# Patient Record
Sex: FEMALE | Race: WHITE | HISPANIC OR LATINO | Employment: UNEMPLOYED | URBAN - METROPOLITAN AREA
[De-identification: names, ages, dates, MRNs, and addresses within clinical notes are randomized per-mention and may not be internally consistent; named-entity substitution may affect disease eponyms.]

---

## 2023-09-14 ENCOUNTER — OFFICE VISIT (OUTPATIENT)
Dept: FAMILY MEDICINE | Facility: CLINIC | Age: 33
End: 2023-09-14
Payer: COMMERCIAL

## 2023-09-14 VITALS
RESPIRATION RATE: 22 BRPM | TEMPERATURE: 98.2 F | DIASTOLIC BLOOD PRESSURE: 60 MMHG | OXYGEN SATURATION: 100 % | HEIGHT: 62 IN | HEART RATE: 89 BPM | WEIGHT: 136 LBS | BODY MASS INDEX: 25.03 KG/M2 | SYSTOLIC BLOOD PRESSURE: 106 MMHG

## 2023-09-14 DIAGNOSIS — Z90.3 S/P GASTRIC SLEEVE PROCEDURE: ICD-10-CM

## 2023-09-14 DIAGNOSIS — Z11.3 ROUTINE SCREENING FOR STI (SEXUALLY TRANSMITTED INFECTION): ICD-10-CM

## 2023-09-14 DIAGNOSIS — Z11.59 NEED FOR HEPATITIS C SCREENING TEST: ICD-10-CM

## 2023-09-14 DIAGNOSIS — Z12.4 CERVICAL CANCER SCREENING: ICD-10-CM

## 2023-09-14 DIAGNOSIS — N76.0 BACTERIAL VAGINOSIS: ICD-10-CM

## 2023-09-14 DIAGNOSIS — B96.89 BACTERIAL VAGINOSIS: ICD-10-CM

## 2023-09-14 DIAGNOSIS — B37.31 YEAST INFECTION OF THE VAGINA: ICD-10-CM

## 2023-09-14 DIAGNOSIS — N89.8 VAGINAL DISCHARGE: ICD-10-CM

## 2023-09-14 DIAGNOSIS — Z00.00 ROUTINE HISTORY AND PHYSICAL EXAMINATION OF ADULT: Primary | ICD-10-CM

## 2023-09-14 DIAGNOSIS — D50.0 IRON DEFICIENCY ANEMIA DUE TO CHRONIC BLOOD LOSS: ICD-10-CM

## 2023-09-14 DIAGNOSIS — Z11.4 SCREENING FOR HIV (HUMAN IMMUNODEFICIENCY VIRUS): ICD-10-CM

## 2023-09-14 DIAGNOSIS — Z13.1 SCREENING FOR DIABETES MELLITUS: ICD-10-CM

## 2023-09-14 LAB
CLUE CELLS: PRESENT
ERYTHROCYTE [DISTWIDTH] IN BLOOD BY AUTOMATED COUNT: 17.2 % (ref 10–15)
HBA1C MFR BLD: 5.1 % (ref 0–5.6)
HCT VFR BLD AUTO: 27.8 % (ref 35–47)
HGB BLD-MCNC: 8.4 G/DL (ref 11.7–15.7)
MCH RBC QN AUTO: 19.8 PG (ref 26.5–33)
MCHC RBC AUTO-ENTMCNC: 30.2 G/DL (ref 31.5–36.5)
MCV RBC AUTO: 65 FL (ref 78–100)
PLATELET # BLD AUTO: 301 10E3/UL (ref 150–450)
RBC # BLD AUTO: 4.25 10E6/UL (ref 3.8–5.2)
TRICHOMONAS, WET PREP: ABNORMAL
WBC # BLD AUTO: 5.8 10E3/UL (ref 4–11)
WBC'S/HIGH POWER FIELD, WET PREP: ABNORMAL
YEAST, WET PREP: PRESENT

## 2023-09-14 PROCEDURE — 83540 ASSAY OF IRON: CPT | Performed by: NURSE PRACTITIONER

## 2023-09-14 PROCEDURE — G0145 SCR C/V CYTO,THINLAYER,RESCR: HCPCS | Performed by: NURSE PRACTITIONER

## 2023-09-14 PROCEDURE — 83550 IRON BINDING TEST: CPT | Performed by: NURSE PRACTITIONER

## 2023-09-14 PROCEDURE — 90471 IMMUNIZATION ADMIN: CPT | Performed by: NURSE PRACTITIONER

## 2023-09-14 PROCEDURE — 87624 HPV HI-RISK TYP POOLED RSLT: CPT | Performed by: NURSE PRACTITIONER

## 2023-09-14 PROCEDURE — 87210 SMEAR WET MOUNT SALINE/INK: CPT | Performed by: NURSE PRACTITIONER

## 2023-09-14 PROCEDURE — 83036 HEMOGLOBIN GLYCOSYLATED A1C: CPT | Performed by: NURSE PRACTITIONER

## 2023-09-14 PROCEDURE — 87389 HIV-1 AG W/HIV-1&-2 AB AG IA: CPT | Performed by: NURSE PRACTITIONER

## 2023-09-14 PROCEDURE — 99385 PREV VISIT NEW AGE 18-39: CPT | Mod: 25 | Performed by: NURSE PRACTITIONER

## 2023-09-14 PROCEDURE — 86803 HEPATITIS C AB TEST: CPT | Performed by: NURSE PRACTITIONER

## 2023-09-14 PROCEDURE — 87491 CHLMYD TRACH DNA AMP PROBE: CPT | Performed by: NURSE PRACTITIONER

## 2023-09-14 PROCEDURE — 36415 COLL VENOUS BLD VENIPUNCTURE: CPT | Performed by: NURSE PRACTITIONER

## 2023-09-14 PROCEDURE — 87591 N.GONORRHOEAE DNA AMP PROB: CPT | Performed by: NURSE PRACTITIONER

## 2023-09-14 PROCEDURE — 82728 ASSAY OF FERRITIN: CPT | Performed by: NURSE PRACTITIONER

## 2023-09-14 PROCEDURE — 90686 IIV4 VACC NO PRSV 0.5 ML IM: CPT | Performed by: NURSE PRACTITIONER

## 2023-09-14 PROCEDURE — 85027 COMPLETE CBC AUTOMATED: CPT | Performed by: NURSE PRACTITIONER

## 2023-09-14 RX ORDER — FLUCONAZOLE 150 MG/1
150 TABLET ORAL ONCE
Qty: 1 TABLET | Refills: 0 | Status: SHIPPED | OUTPATIENT
Start: 2023-09-14 | End: 2023-09-14

## 2023-09-14 RX ORDER — METRONIDAZOLE 7.5 MG/G
1 GEL VAGINAL DAILY
Qty: 70 G | Refills: 0 | Status: SHIPPED | OUTPATIENT
Start: 2023-09-14 | End: 2023-09-19

## 2023-09-14 ASSESSMENT — ENCOUNTER SYMPTOMS
PALPITATIONS: 0
HEMATURIA: 0
WEAKNESS: 0
DYSURIA: 0
FREQUENCY: 0
FEVER: 0
CHILLS: 0
ARTHRALGIAS: 0
NAUSEA: 0
MYALGIAS: 0
HEMATOCHEZIA: 0
PARESTHESIAS: 0
COUGH: 0
BREAST MASS: 0
ABDOMINAL PAIN: 0
HEADACHES: 0
DIZZINESS: 1
SHORTNESS OF BREATH: 0
DIARRHEA: 0
JOINT SWELLING: 0
HEARTBURN: 1
SORE THROAT: 0
CONSTIPATION: 1
NERVOUS/ANXIOUS: 1
EYE PAIN: 0

## 2023-09-14 ASSESSMENT — PATIENT HEALTH QUESTIONNAIRE - PHQ9
SUM OF ALL RESPONSES TO PHQ QUESTIONS 1-9: 2
SUM OF ALL RESPONSES TO PHQ QUESTIONS 1-9: 2
10. IF YOU CHECKED OFF ANY PROBLEMS, HOW DIFFICULT HAVE THESE PROBLEMS MADE IT FOR YOU TO DO YOUR WORK, TAKE CARE OF THINGS AT HOME, OR GET ALONG WITH OTHER PEOPLE: NOT DIFFICULT AT ALL

## 2023-09-14 ASSESSMENT — PAIN SCALES - GENERAL: PAINLEVEL: NO PAIN (0)

## 2023-09-14 NOTE — PROGRESS NOTES
SUBJECTIVE:   CC: Matilde is an 33 year old who presents for preventive health visit.       Healthy Habits:     Getting at least 3 servings of Calcium per day:  Yes    Bi-annual eye exam:  NO    Dental care twice a year:  NO    Sleep apnea or symptoms of sleep apnea:  None    Diet:  Low salt, Low fat/cholesterol and Carbohydrate counting    Frequency of exercise:  None    Taking medications regularly:  Yes    Medication side effects:  Not applicable and None    Additional concerns today:  No    States she had a Pap about a year ago.  Also reports he had a colposcopy in [facility] about 4 years ago, h/o positive HPV.    Had gone to the ED for itching, bleeding with clots, and discharge, clots.  Having headache, nausea.    PSH:  Gastric sleeve, c section, arm surgery in childhood      Today's PHQ-9 Score:       2023     1:17 PM   PHQ-9 SCORE   PHQ-9 Total Score MyChart 2 (Minimal depression)   PHQ-9 Total Score 2       Have you ever done Advance Care Planning? (For example, a Health Directive, POLST, or a discussion with a medical provider or your loved ones about your wishes): No, advance care planning information given to patient to review.  Patient declined advance care planning discussion at this time.    Social History     Tobacco Use    Smoking status: Never    Smokeless tobacco: Never   Substance Use Topics    Alcohol use: Not on file             2023     1:15 PM   Alcohol Use   Prescreen: >3 drinks/day or >7 drinks/week? No          No data to display              Reviewed orders with patient.  Reviewed health maintenance and updated orders accordingly - Yes  BP Readings from Last 3 Encounters:   23 106/60    Wt Readings from Last 3 Encounters:   23 61.7 kg (136 lb)                  Patient Active Problem List   Diagnosis    S/P gastric sleeve procedure    Iron deficiency anemia due to chronic blood loss     Past Surgical History:   Procedure Laterality Date     SECTION       COLPOSCOPY      In [facility]    ORTHOPEDIC SURGERY      Arm surgery in childhood    OTHER SURGICAL HISTORY      Gastric Sleeve in [facility]       Social History     Tobacco Use    Smoking status: Never    Smokeless tobacco: Never   Substance Use Topics    Alcohol use: Not on file     History reviewed. No pertinent family history.      No Known Allergies    Breast Cancer Screenin/14/2023     1:17 PM   Breast CA Risk Assessment (FHS-7)   Do you have a family history of breast, colon, or ovarian cancer? No / Unknown       Patient under 40 years of age: Routine Mammogram Screening not recommended.   Pertinent mammograms are reviewed under the imaging tab.    History of abnormal Pap smear: yes, see HPI      2023     2:45 PM   PAP / HPV   PAP Negative for Intraepithelial Lesion or Malignancy (NILM)      Reviewed and updated as needed this visit by clinical staff   Tobacco  Allergies  Meds  Problems  Med Hx  Surg Hx  Fam Hx          Reviewed and updated as needed this visit by Provider    Allergies  Meds  Problems  Med Hx  Surg Hx  Fam Hx           Review of Systems   Constitutional:  Negative for chills and fever.   HENT:  Negative for congestion, ear pain, hearing loss and sore throat.    Eyes:  Negative for pain and visual disturbance.   Respiratory:  Negative for cough and shortness of breath.    Cardiovascular:  Negative for chest pain, palpitations and peripheral edema.   Gastrointestinal:  Positive for constipation and heartburn. Negative for abdominal pain, diarrhea, hematochezia and nausea.   Breasts:  Negative for tenderness, breast mass and discharge.   Genitourinary:  Positive for vaginal discharge. Negative for dysuria, frequency, genital sores, hematuria, pelvic pain, urgency and vaginal bleeding.   Musculoskeletal:  Negative for arthralgias, joint swelling and myalgias.   Skin:  Negative for rash.   Neurological:  Positive for dizziness. Negative for weakness, headaches and  "paresthesias.   Psychiatric/Behavioral:  Negative for mood changes. The patient is nervous/anxious.         OBJECTIVE:   /60 (BP Location: Left arm, Patient Position: Sitting, Cuff Size: Adult Regular)   Pulse 89   Temp 98.2  F (36.8  C) (Oral)   Resp 22   Ht 1.568 m (5' 1.75\")   Wt 61.7 kg (136 lb)   LMP 08/24/2023   SpO2 100%   BMI 25.08 kg/m    Physical Exam  GENERAL: healthy, alert and no distress  EYES: Eyes grossly normal to inspection, PERRL and conjunctivae and sclerae normal  HENT: ear canals and TM's normal, nose and mouth without ulcers or lesions  NECK: no adenopathy, no asymmetry, masses, or scars and thyroid normal to palpation  RESP: lungs clear to auscultation - no rales, rhonchi or wheezes  BREAST: normal without masses, tenderness or nipple discharge and no palpable axillary masses or adenopathy  CV: regular rate and rhythm, normal S1 S2, no S3 or S4, no murmur, click or rub, no peripheral edema and peripheral pulses strong  ABDOMEN: soft, nontender, no hepatosplenomegaly, no masses and bowel sounds normal   (female): normal female external genitalia, normal urethral meatus , vaginal mucosa pink, moist, well rugated, vaginal discharge - moderate, yellow, milky, and curd-like, and normal cervix, adnexae, and uterus without masses.  SKIN: no suspicious lesions or rashes  NEURO: Normal strength and tone, mentation intact and speech normal  PSYCH: mentation appears normal, affect normal/bright    Diagnostic Test Results:  Labs reviewed in Epic  Results for orders placed or performed in visit on 09/14/23   CBC with platelets     Status: Abnormal   Result Value Ref Range    WBC Count 5.8 4.0 - 11.0 10e3/uL    RBC Count 4.25 3.80 - 5.20 10e6/uL    Hemoglobin 8.4 (L) 11.7 - 15.7 g/dL    Hematocrit 27.8 (L) 35.0 - 47.0 %    MCV 65 (L) 78 - 100 fL    MCH 19.8 (L) 26.5 - 33.0 pg    MCHC 30.2 (L) 31.5 - 36.5 g/dL    RDW 17.2 (H) 10.0 - 15.0 %    Platelet Count 301 150 - 450 10e3/uL   Hemoglobin " A1c     Status: Normal   Result Value Ref Range    Hemoglobin A1C 5.1 0.0 - 5.6 %   Iron and iron binding capacity     Status: Abnormal   Result Value Ref Range    Iron 10 (L) 37 - 145 ug/dL    Iron Binding Capacity 424 240 - 430 ug/dL    Iron Sat Index 2 (L) 15 - 46 %   Ferritin     Status: Abnormal   Result Value Ref Range    Ferritin 4 (L) 6 - 175 ng/mL   Pap Screen with HPV - recommended age 30 - 65 years     Status: None   Result Value Ref Range    Interpretation        Negative for Intraepithelial Lesion or Malignancy (NILM)    Comment         Papanicolaou Test Limitations:  Cervical cytology is a screening test with limited sensitivity, and regular screening is critical for cancer prevention.  Pap tests are primarily effective for the diagnosis/prevention of squamous cell carcinoma, not adenocarcinoma or other cancers.        Specimen Adequacy       Satisfactory for evaluation, endocervical/transformation zone component absent    Clinical Information       none      LMP/Menopause Date       8/24/2023      Reflex Testing Yes regardless of result     Previous Abnormal?       No      Performing Labs       The technical component of this testing was completed at [facility]     Wet prep - Clinic Collect     Status: Abnormal    Specimen: Vagina; Swab   Result Value Ref Range    Trichomonas Absent Absent    Yeast Present (A) Absent    Clue Cells Present (A) Absent    WBCs/high power field 1+ (A) None   Chlamydia trachomatis/Neisseria gonorrhoeae by PCR - Clinic Collect     Status: Normal    Specimen: Vagina; Swab   Result Value Ref Range    Chlamydia Trachomatis Negative Negative    Neisseria gonorrhoeae Negative Negative       ASSESSMENT/PLAN:   Matilde was seen today for physical.    Diagnoses and all orders for this visit:    Routine history and physical examination of adult  -     CBC with platelets; Future  -     CBC with platelets    Screening for  HIV (human immunodeficiency virus)  -     HIV Antigen Antibody Combo; Future  -     HIV Antigen Antibody Combo    Need for hepatitis C screening test  -     Hepatitis C Screen Reflex to HCV RNA Quant and Genotype; Future  -     Hepatitis C Screen Reflex to HCV RNA Quant and Genotype    Cervical cancer screening  -     Pap Screen with HPV - recommended age 30 - 65 years  -     HPV Hold (Lab Only)  H/o previous abnormal screenings as per HPI    Routine screening for STI (sexually transmitted infection)  -     Chlamydia trachomatis/Neisseria gonorrhoeae by PCR - Clinic Collect    Screening for diabetes mellitus  -     Hemoglobin A1c; Future  -     Hemoglobin A1c    Vaginal discharge  -     Wet prep - Clinic Collect    Bacterial vaginosis  -     fluconazole (DIFLUCAN) 150 MG tablet; Take 1 tablet (150 mg) by mouth once for 1 dose    Yeast infection of the vagina  -     metroNIDAZOLE (METROGEL) 0.75 % vaginal gel; Place 1 applicator (5 g) vaginally daily for 5 days    Iron deficiency anemia due to chronic blood loss  -     Iron and iron binding capacity  -     Ferritin  -     Start ferrous sulfate (FE TABS) 325 (65 Fe) MG EC tablet; Take 1 tablet (325 mg) by mouth daily  - Follow-up in 4 weeks for recheck.    S/P gastric sleeve procedure  Taken under consideration in context of iron deficiency anemia, poor absorption of iron could be occurring due to this surgical history.    Other orders  -     REVIEW OF HEALTH MAINTENANCE PROTOCOL ORDERS  -     INFLUENZA VACCINE IM > 6 MONTHS VALENT IIV4 (AFLURIA/FLUZONE)  -     PRIMARY CARE FOLLOW-UP SCHEDULING; Future              COUNSELING:  Reviewed preventive health counseling, as reflected in patient instructions       Regular exercise       Healthy diet/nutrition        She reports that she has never smoked. She has never used smokeless tobacco.          Haley Lancaster NP  [facility] submitted by the patient for this visit:  Patient [facility]  Questionnaire (Submitted on 9/14/2023)  If you checked off any problems, how difficult have these problems made it for you to do your work, take care of things at home, or get along with other people?: Not difficult at all  PHQ9 TOTAL SCORE: 2

## 2023-09-14 NOTE — NURSING NOTE
"Injectable Influenza Immunization Documentation    1.  Has the patient received the information for the injectable influenza vaccine? YES     2. Is the patient 6 months of age or older? YES     3. Does the patient have any of the following contraindications?         Severe allergy to eggs? No     Severe allergic reaction to previous influenza vaccines? No   Severe allergy to latex? No       History of Guillain-Philadelphia syndrome? No     Currently have a temperature greater than 100.4F? No        4.  Severely egg allergic patients should have flu vaccine eligibility assessed by an MD, RN, or pharmacist, and those who received flu vaccine should be observed for 15 min by an MD, RN, Pharmacist, Medical Technician, or member of clinic staff.\": YES    5. Latex-allergic patients should be given latex-free influenza vaccine Yes. Please reference the Vaccine latex table to determine if your clinic s product is latex-containing.       Vaccination given by Raiza An CMA        "

## 2023-09-15 ENCOUNTER — TELEPHONE (OUTPATIENT)
Dept: FAMILY MEDICINE | Facility: CLINIC | Age: 33
End: 2023-09-15
Payer: COMMERCIAL

## 2023-09-15 DIAGNOSIS — D50.0 IRON DEFICIENCY ANEMIA DUE TO CHRONIC BLOOD LOSS: Primary | ICD-10-CM

## 2023-09-15 LAB
C TRACH DNA SPEC QL PROBE+SIG AMP: NEGATIVE
FERRITIN SERPL-MCNC: 4 NG/ML (ref 6–175)
IRON BINDING CAPACITY (ROCHE): 424 UG/DL (ref 240–430)
IRON SATN MFR SERPL: 2 % (ref 15–46)
IRON SERPL-MCNC: 10 UG/DL (ref 37–145)
N GONORRHOEA DNA SPEC QL NAA+PROBE: NEGATIVE

## 2023-09-15 RX ORDER — FERROUS SULFATE 325(65) MG
325 TABLET, DELAYED RELEASE (ENTERIC COATED) ORAL DAILY
Qty: 90 TABLET | Refills: 1 | Status: SHIPPED | OUTPATIENT
Start: 2023-09-15 | End: 2023-10-13

## 2023-09-15 NOTE — TELEPHONE ENCOUNTER
Please call patient with .    Some of her labs are still in process.  But I would like her to know today that her vaginal test came back positive for bacterial vaginosis and yeast.  And I sent in prescriptions to treat both.  Her gonorrhea and chlamydia screenings were negative.    Her hemoglobin is low and her ferritin (iron storage) low-this is consistent with iron deficiency anemia, patient had told me she has history of this.  She should start daily iron supplement (I sent prescription) daily.  Take it with vitamin C-rich food (such as 3 clementines) to help with absorption).  If she has any difficulty with taking the daily iron, please tell her to call us.    Please schedule her for an in office follow up with me in 4 weeks.    Haley Lancaster, DNP, APRN, CNP  
RN called patient/family and relayed provider's message. Patient/family verbalized understanding.     RN helped schedule appt    Enriqueta Martinez RN, BSN  [facility] [place]    
36.4

## 2023-09-18 PROBLEM — Z90.3 S/P GASTRIC SLEEVE PROCEDURE: Status: ACTIVE | Noted: 2023-09-18

## 2023-09-18 PROBLEM — D50.0 IRON DEFICIENCY ANEMIA DUE TO CHRONIC BLOOD LOSS: Status: ACTIVE | Noted: 2023-09-18

## 2023-09-18 LAB
BKR LAB AP GYN ADEQUACY: NORMAL
BKR LAB AP GYN INTERPRETATION: NORMAL
BKR LAB AP HPV REFLEX: NORMAL
BKR LAB AP LMP: NORMAL
BKR LAB AP PREVIOUS ABNORMAL: NORMAL
HCV AB SERPL QL IA: NONREACTIVE
HIV 1+2 AB+HIV1 P24 AG SERPL QL IA: NONREACTIVE
PATH REPORT.COMMENTS IMP SPEC: NORMAL
PATH REPORT.COMMENTS IMP SPEC: NORMAL
PATH REPORT.RELEVANT HX SPEC: NORMAL

## 2023-09-20 LAB
HUMAN PAPILLOMA VIRUS 16 DNA: NEGATIVE
HUMAN PAPILLOMA VIRUS 18 DNA: NEGATIVE
HUMAN PAPILLOMA VIRUS FINAL DIAGNOSIS: NORMAL
HUMAN PAPILLOMA VIRUS OTHER HR: NEGATIVE

## 2023-10-13 ENCOUNTER — OFFICE VISIT (OUTPATIENT)
Dept: FAMILY MEDICINE | Facility: CLINIC | Age: 33
End: 2023-10-13
Payer: COMMERCIAL

## 2023-10-13 VITALS
HEIGHT: 62 IN | SYSTOLIC BLOOD PRESSURE: 106 MMHG | WEIGHT: 136.2 LBS | OXYGEN SATURATION: 100 % | BODY MASS INDEX: 25.06 KG/M2 | DIASTOLIC BLOOD PRESSURE: 60 MMHG | RESPIRATION RATE: 22 BRPM | HEART RATE: 94 BPM | TEMPERATURE: 98.6 F

## 2023-10-13 DIAGNOSIS — D50.0 IRON DEFICIENCY ANEMIA DUE TO CHRONIC BLOOD LOSS: Primary | ICD-10-CM

## 2023-10-13 LAB
ERYTHROCYTE [DISTWIDTH] IN BLOOD BY AUTOMATED COUNT: 18.9 % (ref 10–15)
HCT VFR BLD AUTO: 27.9 % (ref 35–47)
HGB BLD-MCNC: 8.1 G/DL (ref 11.7–15.7)
MCH RBC QN AUTO: 20 PG (ref 26.5–33)
MCHC RBC AUTO-ENTMCNC: 29 G/DL (ref 31.5–36.5)
MCV RBC AUTO: 69 FL (ref 78–100)
PLATELET # BLD AUTO: 275 10E3/UL (ref 150–450)
RBC # BLD AUTO: 4.04 10E6/UL (ref 3.8–5.2)
WBC # BLD AUTO: 5.6 10E3/UL (ref 4–11)

## 2023-10-13 PROCEDURE — 36415 COLL VENOUS BLD VENIPUNCTURE: CPT | Performed by: NURSE PRACTITIONER

## 2023-10-13 PROCEDURE — 99213 OFFICE O/P EST LOW 20 MIN: CPT | Performed by: NURSE PRACTITIONER

## 2023-10-13 PROCEDURE — 85027 COMPLETE CBC AUTOMATED: CPT | Performed by: NURSE PRACTITIONER

## 2023-10-13 RX ORDER — FERROUS SULFATE 325(65) MG
325 TABLET, DELAYED RELEASE (ENTERIC COATED) ORAL 2 TIMES DAILY
Qty: 180 TABLET | Refills: 1 | Status: SHIPPED | OUTPATIENT
Start: 2023-10-13 | End: 2023-12-08

## 2023-10-13 ASSESSMENT — PAIN SCALES - GENERAL: PAINLEVEL: NO PAIN (0)

## 2023-10-13 NOTE — PROGRESS NOTES
"  Assessment & Plan     Iron deficiency anemia due to chronic blood loss  Hemoglobin today 8.1 which has not improved from last time 8.4.  - CBC with platelets  - Increase dosage to ferrous sulfate (FE TABS) 325 (65 Fe) MG EC tablet; Take 1 tablet (325 mg) by mouth 2 times daily  Discussed with patient the indication and use of medication(s), risks/benefits, and potential adverse side effects.  Patient/guardian verbalized understanding and agreement with the plan.   - Ferritin; Future  - Follow-up in 2 months for recheck.             BMI:   Estimated body mass index is 25.11 kg/m  as calculated from the following:    Height as of this encounter: 1.568 m (5' 1.75\").    Weight as of this encounter: 61.8 kg (136 lb 3.2 oz).         Haley Lancaster NP  [facility]   Matilde is a 33 year old, presenting for the following health issues:  RECHECK (Labs/)      HPI     Recheck blood tests.    How many servings of fruits and vegetables do you eat daily?  2-3  On average, how many sweetened beverages do you drink each day (Examples: soda, juice, sweet tea, etc.  Do NOT count diet or artificially sweetened beverages)?   0  How many days per week do you exercise enough to make your heart beat faster? no  How many minutes a day do you exercise enough to make your heart beat faster? no  How many days per week do you miss taking your medication? 0    Concern - Iron Deficiency Anemia  Onset: years ago  Intensity: mild  Progression of Symptoms:  same  Accompanying Signs & Symptoms: fatigue  Previous history of similar problem: she reports h/o iron deficiency anemia in past years  Precipitating factors:        Worsened by: Had gastric sleeve surgery which could be impairing absorption.  Alleviating factors:        Improved by: none  Therapies tried and outcome: ferrous sulfate 325 mg daily  Not havng dizziness.      Review of Systems   Constitutional, HEENT, cardiovascular, pulmonary, gi and " "gu systems are negative, except as otherwise noted.      Objective    /60 (BP Location: Right arm, Patient Position: Sitting, Cuff Size: Adult Regular)   Pulse 94   Temp 98.6  F (37  C) (Oral)   Resp 22   Ht 1.568 m (5' 1.75\")   Wt 61.8 kg (136 lb 3.2 oz)   LMP 10/13/2023   SpO2 100%   BMI 25.11 kg/m    Body mass index is 25.11 kg/m .  Physical Exam   GENERAL: healthy, alert and no distress  PSYCH: mentation appears normal, affect normal/bright    Office Visit on 09/14/2023                                                                                                                                                                      WBC Count 09/14/2023 5.8  4.0 - 11.0 10e3/uL Final    RBC Count 09/14/2023 4.25  3.80 - 5.20 10e6/uL Final    Hemoglobin 09/14/2023 8.4 (L)  11.7 - 15.7 g/dL Final    Hematocrit 09/14/2023 27.8 (L)  35.0 - 47.0 % Final    MCV 09/14/2023 65 (L)  78 - 100 fL Final    MCH 09/14/2023 19.8 (L)  26.5 - 33.0 pg Final    MCHC 09/14/2023 30.2 (L)  31.5 - 36.5 g/dL Final    RDW 09/14/2023 17.2 (H)  10.0 - 15.0 % Final    Platelet Count 09/14/2023 301  150 - 450 10e3/uL Final    Hemoglobin A1C 09/14/2023 5.1  0.0 - 5.6 % Final    Normal <5.7%   Prediabetes 5.7-6.4%    Diabetes 6.5% or higher     Note: Adopted from ADA consensus guidelines.    Iron 09/14/2023 10 (L)  37 - 145 ug/dL Final    Iron Binding Capacity 09/14/2023 424  240 - 430 ug/dL Final    Iron Sat Index 09/14/2023 2 (L)  15 - 46 % Final    Ferritin 09/14/2023 4 (L)  6 - 175 ng/mL Final                                                    Value:This result contains rich text formatting which cannot be displayed here.     Results for orders placed or performed in visit on 10/13/23 (from the past 24 hour(s))   CBC with platelets   Result Value Ref Range    WBC Count 5.6 4.0 - 11.0 10e3/uL    RBC Count 4.04 3.80 - 5.20 10e6/uL    Hemoglobin 8.1 (L) 11.7 - 15.7 g/dL    Hematocrit 27.9 (L) 35.0 - 47.0 %    MCV 69 (L) 78 - 100 " fL    MCH 20.0 (L) 26.5 - 33.0 pg    MCHC 29.0 (L) 31.5 - 36.5 g/dL    RDW 18.9 (H) 10.0 - 15.0 %    Platelet Count 275 150 - 450 10e3/uL

## 2023-12-08 ENCOUNTER — OFFICE VISIT (OUTPATIENT)
Dept: FAMILY MEDICINE | Facility: CLINIC | Age: 33
End: 2023-12-08
Payer: COMMERCIAL

## 2023-12-08 VITALS
WEIGHT: 136.2 LBS | BODY MASS INDEX: 25.06 KG/M2 | HEART RATE: 78 BPM | RESPIRATION RATE: 15 BRPM | TEMPERATURE: 98.3 F | OXYGEN SATURATION: 99 % | DIASTOLIC BLOOD PRESSURE: 60 MMHG | SYSTOLIC BLOOD PRESSURE: 106 MMHG | HEIGHT: 62 IN

## 2023-12-08 DIAGNOSIS — D50.8 OTHER IRON DEFICIENCY ANEMIA: Primary | ICD-10-CM

## 2023-12-08 DIAGNOSIS — Z90.3 S/P GASTRIC SLEEVE PROCEDURE: ICD-10-CM

## 2023-12-08 DIAGNOSIS — D17.30 LIPOMA OF SKIN AND SUBCUTANEOUS TISSUE: ICD-10-CM

## 2023-12-08 LAB
ERYTHROCYTE [DISTWIDTH] IN BLOOD BY AUTOMATED COUNT: 16.5 % (ref 10–15)
FERRITIN SERPL-MCNC: 5 NG/ML (ref 6–175)
HCT VFR BLD AUTO: 28.7 % (ref 35–47)
HGB BLD-MCNC: 8.5 G/DL (ref 11.7–15.7)
MCH RBC QN AUTO: 22.1 PG (ref 26.5–33)
MCHC RBC AUTO-ENTMCNC: 29.6 G/DL (ref 31.5–36.5)
MCV RBC AUTO: 75 FL (ref 78–100)
PLATELET # BLD AUTO: 266 10E3/UL (ref 150–450)
RBC # BLD AUTO: 3.84 10E6/UL (ref 3.8–5.2)
WBC # BLD AUTO: 5.3 10E3/UL (ref 4–11)

## 2023-12-08 PROCEDURE — 99214 OFFICE O/P EST MOD 30 MIN: CPT | Performed by: NURSE PRACTITIONER

## 2023-12-08 PROCEDURE — 36415 COLL VENOUS BLD VENIPUNCTURE: CPT | Performed by: NURSE PRACTITIONER

## 2023-12-08 PROCEDURE — 85027 COMPLETE CBC AUTOMATED: CPT | Performed by: NURSE PRACTITIONER

## 2023-12-08 PROCEDURE — 82728 ASSAY OF FERRITIN: CPT | Performed by: NURSE PRACTITIONER

## 2023-12-08 RX ORDER — FERROUS SULFATE 325(65) MG
325 TABLET, DELAYED RELEASE (ENTERIC COATED) ORAL 2 TIMES DAILY
Qty: 180 TABLET | Refills: 1 | Status: SHIPPED | OUTPATIENT
Start: 2023-12-08 | End: 2023-12-15

## 2023-12-08 RX ORDER — PEDI MULTIVIT NO.25/FOLIC ACID 300 MCG
1 TABLET,CHEWABLE ORAL 2 TIMES DAILY
Qty: 180 TABLET | Refills: 3 | Status: SHIPPED | OUTPATIENT
Start: 2023-12-08

## 2023-12-08 ASSESSMENT — PAIN SCALES - GENERAL: PAINLEVEL: NO PAIN (0)

## 2023-12-08 NOTE — COMMUNITY RESOURCES LIST (PATIENT PREFERRED LANGUAGE)
12/08/2023   [facility] [email or web address] S[facility] de C.V.  N/A  Si tiene preguntas sobre esta lista de recursos o necesidades de atención adicionales, comuníquese con salvador clínica de atención primaria o administrador de atención.  Phone: [phone]   Email: N/A   Address: [address] [place] [address]   Hours: N/A        Líneas directas y líneas de ayuda       Línea directa - Crisis de vivienda  1  La vivienda de nuestro Wade Distancia: 6.79 millas      Teléfono/Virtual   [address] [place] [address]  Idioma: Ingoliverio Downey: milies - juan Abierto 24 horas   Phone: [phone] Email: [email or web address] Website: [email or web address]     2  [facility] Distancia: 6.93 millas      Teléfono/Virtual   [address] [place] [address]  Idioma: Liane Rawlsas: milies - juan Abierto 24 horas   Phone: [phone] Email: [email or web address] Website: [email or web address]          Alojamiento       Punto de acceso de entrada coordinado  3  Ejército de Salvación - Oficina de Servicios Sociales - [place] Distancia: 5.03 millas      Teléfono/Virtual   [address] [address]  Idioma: Inglés  Horas: lunes - viernes 8:30 - 12:00 , lunes - viernes 13:00 - 16:00  Honorarios: Mati   Phone: [phone] ext.2 Email: [email or web address] Website: [email or web address]     4  Servicio Social [facility] - Servicios de vivienda Distancia: 6.79 millas      En persona   [address] [place] [address]  Idioma: Liane Downey: skye holloway 9:00 - 17:00  Honorarios: Mati   Phone: [phone] Email: [email or web address] Website: [email or web address]     Centro de acogida o audrey diurno  5  Compartir y cuidar tino Distancia: 5.22 millas      En persona   [address] [place] [address]  Idioma: malena Stratton Inglés, somalí  Horas: skye prescott  8:30 - 16:30 , sábado - juan 9:00 - 12:00  Honorarios: [place]   Phone: [phone] Email: [email or web address] Website: [email or web address]     6  [facility] [place] Distancia: 5.79 millas      En persona   [address] [place] [address]  Idioma: Liane Stratton somalí  Horas: lunes - sábado 7:00 - 15:00  Honorarios: Auto Mati garg   Phone: [phone] Email: [email or web address] Website: [email or web address]://[email or web address]/locations/opportunity-center/     Asistencia para la búsqueda de vivienda  7  Corporación de Asistencia [employer]) Distancia: 3.28 millas      Teléfono/Virtual   [address] [place] [address]  Idioma: Liane Stratton  Horas: lunes - viernes 9:00 - 17:00  Honorarios: Mati   Phone: [phone] Email: services@[email or web address] Website: [email or web address]     8  Autoridad de Vivienda Pública de [place] - Vivienda pública para personas de bajos ingresos Distancia: 13.91 millas      En persona   [address] [place] [address]  Idioma: Inglés  Horas: lunes 8:00 - 15:30 , miércoles 8:00 - 15:30 , viernes 8:00 - 15:30  Honorarios: Tarifa variable   Phone: [phone] Email: [email or web address] Website: [email or web address]://[email or web address] [email or web address]/     Micky para familias  9  [place] [facility] [place] Distancia: 7.34 millas      En persona   [address] [facility] [address] [address]  Idioma: Liane  Horas: lunes - viernes 15:00 - 9:00 , sábado - juan Abierto 24 horas  Honorarios: Mati   Phone: [phone] ext.1 Website: [email or web address]     Micky para particulares  10  [facility] Distancia: 5.51 millas      En persona   [address] [place] [address]  Idioma: Liane  Horas: skye holloway 16:00 - 9:00  Honorarios: Mati   Phone: [phone]  Email: [email or web address].[employer] - [employer].org Website: [email or web address]/[email or web address]/     11  La vivienda de nuvalentinoo Wade Distancia: 6.37 millas      En persona   [address]  [address]  Idioma: Liane  Horas: skye - juan Abierto 24 horas  Honorarios: Mati   Phone: [phone] Email: [email or web address] Website: [email or web address]          Números y sitios web importantes       Servicios de emergencia   911  Servicios de la ciudad   311  Control de envenenamiento   [phone]  Línea vital de prevención del suicidio   [phone] (TALK)  Línea directa contra el abuso de niños   [phone] (4-A-Child)  Línea directa contra el abuso sexual   [phone] (HOPE)  Línea directa nacional de emergencia para fugitivos   [phone] (RUNAWAY)  Línea de ayuda para la javier embarazada   [phone]  Derivación para el tratamiento por abuso de sustancias   [phone] (HELP)

## 2023-12-08 NOTE — COMMUNITY RESOURCES LIST (ENGLISH)
12/08/2023   [facility]  N/A  For questions about this resource list or additional care needs, please contact your primary care clinic or care manager.  Phone: [phone]   Email: N/A   Address: [facility] [place] [address]   Hours: N/A        Hotlines and Helplines       Hotline - Housing crisis  1  [facility] Distance: 6.79 miles      Phone/Virtual   [address] [place] [address]  Language: English  Hours: Mon - Sun Open 24 Hours   Phone: (905) 669-7474 Email: communications@[facility].org Website: https://[facility]-[place].org/oursaviourshousing/     2  [facility] Distance: 7.34 miles      Phone/Virtual   [address] [place] [address]  Language: English  Hours: Mon - Sun Open 24 Hours   Phone: (419) 376-7078 Email: info@[facility] Website: [email or web address]://www.[facility].org          Housing       Coordinated Entry access point  3  [facility]  Office - [facility] Distance: 5.22 miles      Phone/Virtual   [address] [address]  Language: English  Hours: Mon - Fri 8:30 AM - 12:00 PM , Mon - Fri 1:00 PM - 4:00 PM  Fees: Free   Phone: (271) 227-7925 Ext.2 Email: [email or web address]@[facility].[email or web address].org Website: https://www.[employer].org/usn/     4  [facility] Distance: 6.93 miles      In-Person   [address] [place] [address]  Language: English  Hours: Mon - Fri 9:00 AM - 5:00 PM  Fees: Free   Phone: (705) 132-8952 Email: housing@[facility].org Website: http://www.[facility].org/housing     Drop-in center or day shelter  5  [facility] Distance: 5.51 miles      In-Person   [address] [place] [address]  Language: English, Hmong, Marshallese, Polish  Hours: Mon - Thu 8:30 AM - 4:30 PM , Sat - Sun 9:00 AM - 12:00 PM  Fees: Free   Phone: (120) 421-4633 Email: info@[employer].org Website:  https://[facility].org/     6  [facility] [place] [facility] Distance: 6.37 miles      In-Person   [address] [place] [address]  Language: English, Cameroonian, Citizen of [place]  Hours: Mon - Sat 7:00 AM - 3:00 PM  Fees: Free, Self Pay   Phone: (114) 350-8683 Email: info@[facility] Website: https://www.[employer]/locations/opportunity-center/     Housing search assistance  7  [employer] [facility] [employer] [facility] Distance: 3.28 miles      Phone/Virtual   [address] [place] [address]  Language: English, Citizen of [place]  Hours: Mon - Fri 9:00 AM - 5:00 PM  Fees: Free   Phone: (961) 161-2135 Email: services@[employer] Website: https://www.[employer]     8  [place] [facility] Low-income [facility] Distance: 5.03 miles      In-Person   [address] [place] [address]  Language: English  Hours: Mon 8:00 AM - 3:30 PM , Wed 8:00 AM - 3:30 PM , Fri 8:00 AM - 3:30 PM  Fees: Sliding Fee   Phone: (297) 813-6499 Email: [email or web address]@[facility].org Website: http://www.[facility].org/     Shelter for families  9  [facility] Distance: 13.91 miles      In-Person   [address] [place] [address]  Language: English  Hours: Mon - Fri 3:00 PM - 9:00 AM , Sat - Sun Open 24 Hours  Fees: Free   Phone: (498) 842-5618 Ext.1 Website: https://www.[facility].org/2020/07/03/emergency-family-shelter/     Shelter for individuals  10  [facility] Distance: 5.79 miles      In-Person   [address] [place] [address]  Language: English  Hours: Mon - Fri 4:00 PM - 9:00 AM  Fees: Free   Phone: (288) 591-7651 Email: chad@[facility].org Website: https://centralusa.[facility].org/[facility]/     11  [facility] Distance: 6.79 miles      In-Person   [address] [place] [address]  Language: English  Hours: Mon  - Sun Open 24 Hours  Fees: Free   Phone: (158) 405-9354 Email: communications@[facility].org Website: https://[facility]-[place].org/oursaviourshousing/          Important Numbers & Websites       Emergency Services   911  [facility] Services   311  Poison Control   (848) 243-6032  Suicide Prevention Lifeline   (613) 166-5695 (TALK)  Child Abuse Hotline   (260) 616-3914 (4-A-Child)  Sexual Assault Hotline   (146) 487-1087 (HOPE)  National Runaway Safeline   (790) 258-2643 (RUNAWAY)  All-Options Talkline   (862) 913-2261  Substance Abuse Referral   (583) 579-8842 (HELP)

## 2023-12-08 NOTE — LETTER
December 18, 2023      Matilde Terrell  [address] [facility] [address] [address]        Dear Matilde,     Your recent labs showed low hemoglobin 8.5 (which means you have anemia) and low iron storage (ferritin 5).   Please continue to take Ferrous Sulfate 325 mg twice daily on an empty stomach and I recommend adding in Vitamin C 500 mg at the same time you take in the Ferrous Sulfate because it can help with absorption.   And you were also going to call the [facility] infusion center to get scheduled for iron infusions.     If you have any questions or concerns please feel free to contact me at the office at [phone] or via [employer].     Haley Lancaster, DNP, APRN, CNP     Resulted Orders   CBC with platelets   Result Value Ref Range    WBC Count 5.3 4.0 - 11.0 10e3/uL    RBC Count 3.84 3.80 - 5.20 10e6/uL    Hemoglobin 8.5 (L) 11.7 - 15.7 g/dL    Hematocrit 28.7 (L) 35.0 - 47.0 %    MCV 75 (L) 78 - 100 fL    MCH 22.1 (L) 26.5 - 33.0 pg    MCHC 29.6 (L) 31.5 - 36.5 g/dL    RDW 16.5 (H) 10.0 - 15.0 %    Platelet Count 266 150 - 450 10e3/uL   Ferritin   Result Value Ref Range    Ferritin 5 (L) 6 - 175 ng/mL

## 2023-12-08 NOTE — Clinical Note
KALPESH Armijo,  I have this kind patient who has had a gastric sleeve in the past, now with LI which I think is due to poor absorption of iron supplement in the gut.  She doesn't have heavy periods or other reason for blood loss. Her hemoglobin is not changing much with twice daily ferrous sulfate.  I told her to start two children's flinstone with iron.  I also told her I'd order iron infusions for her.  But I am wondering if you could look and make and recommendations for her?  Or agree with our plan?
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
## 2023-12-15 ENCOUNTER — TELEPHONE (OUTPATIENT)
Dept: FAMILY MEDICINE | Facility: CLINIC | Age: 33
End: 2023-12-15
Payer: COMMERCIAL

## 2023-12-15 PROBLEM — D50.8 OTHER IRON DEFICIENCY ANEMIA: Status: ACTIVE | Noted: 2023-09-18

## 2023-12-15 RX ORDER — MEPERIDINE HYDROCHLORIDE 25 MG/ML
25 INJECTION INTRAMUSCULAR; INTRAVENOUS; SUBCUTANEOUS EVERY 30 MIN PRN
OUTPATIENT
Start: 2023-12-18

## 2023-12-15 RX ORDER — METHYLPREDNISOLONE SODIUM SUCCINATE 125 MG/2ML
125 INJECTION, POWDER, LYOPHILIZED, FOR SOLUTION INTRAMUSCULAR; INTRAVENOUS
Start: 2023-12-18

## 2023-12-15 RX ORDER — HEPARIN SODIUM (PORCINE) LOCK FLUSH IV SOLN 100 UNIT/ML 100 UNIT/ML
5 SOLUTION INTRAVENOUS
OUTPATIENT
Start: 2023-12-18

## 2023-12-15 RX ORDER — EPINEPHRINE 1 MG/ML
0.3 INJECTION, SOLUTION, CONCENTRATE INTRAVENOUS EVERY 5 MIN PRN
OUTPATIENT
Start: 2023-12-18

## 2023-12-15 RX ORDER — ASCORBIC ACID 500 MG
500 TABLET ORAL 2 TIMES DAILY
Qty: 180 TABLET | Refills: 1 | Status: SHIPPED | OUTPATIENT
Start: 2023-12-15

## 2023-12-15 RX ORDER — ALBUTEROL SULFATE 90 UG/1
1-2 AEROSOL, METERED RESPIRATORY (INHALATION)
Start: 2023-12-18

## 2023-12-15 RX ORDER — HEPARIN SODIUM,PORCINE 10 UNIT/ML
5-20 VIAL (ML) INTRAVENOUS DAILY PRN
OUTPATIENT
Start: 2023-12-18

## 2023-12-15 RX ORDER — FERROUS SULFATE 325(65) MG
325 TABLET, DELAYED RELEASE (ENTERIC COATED) ORAL 2 TIMES DAILY
Qty: 180 TABLET | Refills: 1 | Status: SHIPPED | OUTPATIENT
Start: 2023-12-15

## 2023-12-15 RX ORDER — DIPHENHYDRAMINE HYDROCHLORIDE 50 MG/ML
50 INJECTION INTRAMUSCULAR; INTRAVENOUS
Start: 2023-12-18

## 2023-12-15 RX ORDER — ALBUTEROL SULFATE 0.83 MG/ML
2.5 SOLUTION RESPIRATORY (INHALATION)
OUTPATIENT
Start: 2023-12-18

## 2023-12-15 NOTE — TELEPHONE ENCOUNTER
Please call patient.  I put in the orders for iron infusions for her - she should call [facility] and ask to speak with the infusion center to get this arranged.  They will then review the orders and insurance to make sure it is covered and get her scheduled in.    For now, she is taking Ferrous sulfate 325 mg twice daily for her iron deficiency anemia.  Let her know I recommend adding in Vitamin C 500 mg twice daily to take at the same time as Ferrous Sulfate to help improve absorption.  It is also recommended to take on empty stomach.    I have asked the [facility] to mail her lab results to her.    Haley Lancaster, DNP, APRN, CNP

## 2023-12-17 DIAGNOSIS — D50.8 OTHER IRON DEFICIENCY ANEMIA: ICD-10-CM

## 2023-12-18 RX ORDER — FERROUS SULFATE 325(65) MG
TABLET, DELAYED RELEASE (ENTERIC COATED) ORAL
Qty: 264 TABLET | OUTPATIENT
Start: 2023-12-18

## 2025-02-18 ENCOUNTER — OFFICE VISIT (OUTPATIENT)
Dept: FAMILY MEDICINE | Facility: CLINIC | Age: 35
End: 2025-02-18
Payer: COMMERCIAL

## 2025-02-18 VITALS
HEIGHT: 62 IN | DIASTOLIC BLOOD PRESSURE: 60 MMHG | HEART RATE: 84 BPM | BODY MASS INDEX: 28.16 KG/M2 | TEMPERATURE: 98.5 F | SYSTOLIC BLOOD PRESSURE: 118 MMHG | RESPIRATION RATE: 21 BRPM | OXYGEN SATURATION: 98 % | WEIGHT: 153 LBS

## 2025-02-18 DIAGNOSIS — R53.83 FATIGUE, UNSPECIFIED TYPE: ICD-10-CM

## 2025-02-18 DIAGNOSIS — D50.8 OTHER IRON DEFICIENCY ANEMIA: ICD-10-CM

## 2025-02-18 DIAGNOSIS — Z90.3 S/P GASTRIC SLEEVE PROCEDURE: ICD-10-CM

## 2025-02-18 DIAGNOSIS — Z00.00 ROUTINE GENERAL MEDICAL EXAMINATION AT A HEALTH CARE FACILITY: Primary | ICD-10-CM

## 2025-02-18 DIAGNOSIS — Z01.84 IMMUNITY STATUS TESTING: ICD-10-CM

## 2025-02-18 DIAGNOSIS — Z11.3 SCREENING EXAMINATION FOR STI: ICD-10-CM

## 2025-02-18 LAB
ANION GAP SERPL CALCULATED.3IONS-SCNC: 13 MMOL/L (ref 7–15)
BUN SERPL-MCNC: 7.6 MG/DL (ref 6–20)
CALCIUM SERPL-MCNC: 9.2 MG/DL (ref 8.8–10.4)
CHLORIDE SERPL-SCNC: 103 MMOL/L (ref 98–107)
CHOLEST SERPL-MCNC: 175 MG/DL
CLUE CELLS: ABNORMAL
CREAT SERPL-MCNC: 0.45 MG/DL (ref 0.51–0.95)
EGFRCR SERPLBLD CKD-EPI 2021: >90 ML/MIN/1.73M2
ERYTHROCYTE [DISTWIDTH] IN BLOOD BY AUTOMATED COUNT: 13.9 % (ref 10–15)
FASTING STATUS PATIENT QL REPORTED: NO
FASTING STATUS PATIENT QL REPORTED: NO
FERRITIN SERPL-MCNC: 5 NG/ML (ref 6–175)
GLUCOSE SERPL-MCNC: 85 MG/DL (ref 70–99)
HBV SURFACE AB SERPL IA-ACNC: <3.5 M[IU]/ML
HBV SURFACE AB SERPL IA-ACNC: NONREACTIVE M[IU]/ML
HBV SURFACE AG SERPL QL IA: NONREACTIVE
HCO3 SERPL-SCNC: 22 MMOL/L (ref 22–29)
HCT VFR BLD AUTO: 26.4 % (ref 35–47)
HCV AB SERPL QL IA: NONREACTIVE
HDLC SERPL-MCNC: 91 MG/DL
HGB BLD-MCNC: 7.7 G/DL (ref 11.7–15.7)
HIV 1+2 AB+HIV1 P24 AG SERPL QL IA: NONREACTIVE
IRON BINDING CAPACITY (ROCHE): 460 UG/DL (ref 240–430)
IRON SATN MFR SERPL: 3 % (ref 15–46)
IRON SERPL-MCNC: 12 UG/DL (ref 37–145)
LDLC SERPL CALC-MCNC: 76 MG/DL
MCH RBC QN AUTO: 20.3 PG (ref 26.5–33)
MCHC RBC AUTO-ENTMCNC: 29.2 G/DL (ref 31.5–36.5)
MCV RBC AUTO: 70 FL (ref 78–100)
NONHDLC SERPL-MCNC: 84 MG/DL
PLATELET # BLD AUTO: 337 10E3/UL (ref 150–450)
POTASSIUM SERPL-SCNC: 4 MMOL/L (ref 3.4–5.3)
RBC # BLD AUTO: 3.79 10E6/UL (ref 3.8–5.2)
SODIUM SERPL-SCNC: 138 MMOL/L (ref 135–145)
T PALLIDUM AB SER QL: NONREACTIVE
TRICHOMONAS, WET PREP: ABNORMAL
TRIGL SERPL-MCNC: 41 MG/DL
TSH SERPL DL<=0.005 MIU/L-ACNC: 0.7 UIU/ML (ref 0.3–4.2)
VIT B12 SERPL-MCNC: 578 PG/ML (ref 232–1245)
VIT D+METAB SERPL-MCNC: 14 NG/ML (ref 20–50)
WBC # BLD AUTO: 3.8 10E3/UL (ref 4–11)
WBC'S/HIGH POWER FIELD, WET PREP: ABNORMAL
YEAST, WET PREP: ABNORMAL

## 2025-02-18 RX ORDER — ASCORBIC ACID 500 MG
500 TABLET ORAL 2 TIMES DAILY
Qty: 180 TABLET | Refills: 1 | Status: SHIPPED | OUTPATIENT
Start: 2025-02-18

## 2025-02-18 RX ORDER — EPINEPHRINE 1 MG/ML
0.3 INJECTION, SOLUTION, CONCENTRATE INTRAVENOUS EVERY 5 MIN PRN
OUTPATIENT
Start: 2025-02-19

## 2025-02-18 RX ORDER — DIPHENHYDRAMINE HYDROCHLORIDE 50 MG/ML
50 INJECTION INTRAMUSCULAR; INTRAVENOUS
Start: 2025-02-19

## 2025-02-18 RX ORDER — ALBUTEROL SULFATE 90 UG/1
1-2 INHALANT RESPIRATORY (INHALATION)
Start: 2025-02-19

## 2025-02-18 RX ORDER — PEDI MULTIVIT NO.25/FOLIC ACID 300 MCG
1 TABLET,CHEWABLE ORAL 2 TIMES DAILY
Qty: 180 TABLET | Refills: 3 | Status: SHIPPED | OUTPATIENT
Start: 2025-02-18

## 2025-02-18 RX ORDER — ALBUTEROL SULFATE 0.83 MG/ML
2.5 SOLUTION RESPIRATORY (INHALATION)
OUTPATIENT
Start: 2025-02-19

## 2025-02-18 RX ORDER — HEPARIN SODIUM,PORCINE 10 UNIT/ML
5-20 VIAL (ML) INTRAVENOUS DAILY PRN
OUTPATIENT
Start: 2025-02-19

## 2025-02-18 RX ORDER — METHYLPREDNISOLONE SODIUM SUCCINATE 40 MG/ML
40 INJECTION INTRAMUSCULAR; INTRAVENOUS
Start: 2025-02-19

## 2025-02-18 RX ORDER — HEPARIN SODIUM (PORCINE) LOCK FLUSH IV SOLN 100 UNIT/ML 100 UNIT/ML
5 SOLUTION INTRAVENOUS
OUTPATIENT
Start: 2025-02-19

## 2025-02-18 RX ORDER — FERROUS SULFATE 325(65) MG
325 TABLET, DELAYED RELEASE (ENTERIC COATED) ORAL 2 TIMES DAILY
Qty: 180 TABLET | Refills: 1 | Status: SHIPPED | OUTPATIENT
Start: 2025-02-18

## 2025-02-18 RX ORDER — MEPERIDINE HYDROCHLORIDE 25 MG/ML
25 INJECTION INTRAMUSCULAR; INTRAVENOUS; SUBCUTANEOUS
OUTPATIENT
Start: 2025-02-19

## 2025-02-18 RX ORDER — DIPHENHYDRAMINE HYDROCHLORIDE 50 MG/ML
25 INJECTION INTRAMUSCULAR; INTRAVENOUS
Start: 2025-02-19

## 2025-02-18 SDOH — HEALTH STABILITY: PHYSICAL HEALTH: ON AVERAGE, HOW MANY MINUTES DO YOU ENGAGE IN EXERCISE AT THIS LEVEL?: 60 MIN

## 2025-02-18 SDOH — HEALTH STABILITY: PHYSICAL HEALTH: ON AVERAGE, HOW MANY DAYS PER WEEK DO YOU ENGAGE IN MODERATE TO STRENUOUS EXERCISE (LIKE A BRISK WALK)?: 7 DAYS

## 2025-02-18 ASSESSMENT — PAIN SCALES - GENERAL: PAINLEVEL_OUTOF10: NO PAIN (0)

## 2025-02-18 ASSESSMENT — SOCIAL DETERMINANTS OF HEALTH (SDOH): HOW OFTEN DO YOU GET TOGETHER WITH FRIENDS OR RELATIVES?: ONCE A WEEK

## 2025-02-18 NOTE — PROGRESS NOTES
"Preventive Care Visit  [facility]  Haley Lancaster NP, Nurse Practitioner - Family  Feb 18, 2025      Assessment & Plan     Routine general medical examination at a health care facility  - Basic metabolic panel  (Ca, Cl, CO2, Creat, Gluc, K, Na, BUN)  - Ferritin  - Iron and iron binding capacity  - CBC with platelets  - Vitamin B12  - Lipid panel reflex to direct LDL Non-fasting  - Vitamin D Deficiency  - TSH with free T4 reflex    Other iron deficiency anemia due to impaired absorption due to history of bariatric surgery (prior gastric sleeve procedure)  - Chronic, worsening with hemoglobin 7.4.   iron panel and ferritin in process.  She has h/o bariatric surgery- had prior gastric sleeve procedure so the cause if iron deficiency is presumed to be due to impaired absorption.  Due to severity of anemia, no previous improvement with iron supplement, and impaired absorption, will recommend iron infusions.  Will have my team call patient to review recommended plan.    - Ferritin  - Iron and iron binding capacity  - CBC with platelets    S/P gastric sleeve procedure  - Vitamin B12; Future  - Vitamin B12    Screening examination for STI    - HIV Antigen Antibody Combo Cascade  - Treponema Abs w Reflex to RPR and Titer  - Hepatitis C antibody  - Hepatitis B surface antigen  - Wet preparation    Immunity status testing  - Hepatitis B Surface Antibody; Future  - Hepatitis B Surface Antibody    Fatigue, unspecified type    - Vitamin D Deficiency  - TSH with free T4 reflex            BMI  Estimated body mass index is 28.21 kg/m  as calculated from the following:    Height as of this encounter: 1.568 m (5' 1.75\").    Weight as of this encounter: 69.4 kg (153 lb).   Weight management plan: Discussed healthy diet and exercise guidelines    Counseling  Appropriate preventive services were addressed with this patient via screening, questionnaire, or discussion as appropriate for fall prevention, " nutrition, physical activity, Tobacco-use cessation, social engagement, weight loss and cognition.  Checklist reviewing preventive services available has been given to the patient.  Reviewed patient's diet, addressing concerns and/or questions.   The patient was instructed to see the dentist every 6 months.   She is at risk for psychosocial distress and has been provided with information to reduce risk.           Muriel Clayton is a 34 year old, presenting for the following:  Physical (Not fasting )        2/18/2025    10:49 AM   Additional Questions   Roomed by Hilary KEVIN          JUDI Clayton is a 33 yo female in clinic for her routine annual physical. She reports concerns for vaginal discomfort and white discharge for about a month. She states there is an occasional mild odor and itching. She has no current concerns for STI but would like STI testing today. She also complains of coin size clots with her periods that are not absorbed with a pad. She states the discharge is worse with her menstrual cycle but better shortly after a cycle.     At last visit 12/8/23 recommendation was made to get iron infusions for iron deficiency anemia.  Patient never got the iron infusions done.  She was taking ferrous sulfate until she stopped 1 month ago.      Medication Followup of Ferrous Sulfate 325 mg for Iron Deficiency Anemia  Taking Medication as prescribed: NO-states she stopped iron supplement 1 month ago  Side Effects:  None  Medication Helping Symptoms:  yes, since stopping the iron supplement she has been having Fatigue      Health Care Directive  Patient does not have a Health Care Directive: Discussed advance care planning with patient; however, patient declined at this time.      2/18/2025   General Health   How would you rate your overall physical health? (!) FAIR   Feel stress (tense, anxious, or unable to sleep) Very much   (!) STRESS CONCERN      2/18/2025   Nutrition   Three or more servings of calcium each  day? Yes   Diet: Regular (no restrictions)   How many servings of fruit and vegetables per day? (!) 0-1   How many sweetened beverages each day? 0-1         2/18/2025   Exercise   Days per week of moderate/strenous exercise 7 days   Average minutes spent exercising at this level 60 min         2/18/2025   Social Factors   Frequency of gathering with friends or relatives Once a week   Worry food won't last until get money to buy more No   Food not last or not have enough money for food? No   Do you have housing? (Housing is defined as stable permanent housing and does not include staying ouside in a car, in a tent, in an abandoned building, in an overnight shelter, or couch-surfing.) Yes   Are you worried about losing your housing? No   Lack of transportation? No   Unable to get utilities (heat,electricity)? No         2/18/2025   Dental   Dentist two times every year? (!) NO            Today's PHQ-2 Score:       2/18/2025    11:06 AM   PHQ-2 ( 1999 Pfizer)   PHQ-2 Score Incomplete           2/18/2025   Substance Use   Alcohol more than 3/day or more than 7/wk No   Do you use any other substances recreationally? No     Social History     Tobacco Use    Smoking status: Never     Passive exposure: Past    Smokeless tobacco: Never   Vaping Use    Vaping status: Never Used          Mammogram Screening - Patient under 40 years of age: Routine Mammogram Screening not recommended.         2/18/2025   STI Screening   New sexual partner(s) since last STI/HIV test? No     History of abnormal Pap smear: No - age 30- 64 PAP with HPV every 5 years recommended        Latest Ref Rng & Units 9/14/2023     2:45 PM   PAP / HPV   PAP  Negative for Intraepithelial Lesion or Malignancy (NILM)    HPV 16 DNA Negative Negative    HPV 18 DNA Negative Negative    Other HR HPV Negative Negative            2/18/2025   Contraception/Family Planning   Questions about contraception or family planning No        Reviewed and updated as needed this  "visit by Provider                    Past Medical History:   Diagnosis Date    Anemia, iron deficiency             Objective    Exam  /60 (BP Location: Right arm, Patient Position: Sitting, Cuff Size: Adult Regular)   Pulse 84   Temp 98.5  F (36.9  C) (Oral)   Resp 21   Ht 1.568 m (5' 1.75\")   Wt 69.4 kg (153 lb)   LMP 02/03/2025   SpO2 98%   BMI 28.21 kg/m     Estimated body mass index is 28.21 kg/m  as calculated from the following:    Height as of this encounter: 1.568 m (5' 1.75\").    Weight as of this encounter: 69.4 kg (153 lb).    Physical Exam  Constitutional:       General: She is awake.      Appearance: Normal appearance. She is well-groomed.   HENT:      Head: Normocephalic.      Right Ear: Hearing, tympanic membrane and external ear normal.      Left Ear: Hearing, tympanic membrane, ear canal and external ear normal.   Eyes:      General: Lids are normal.      Extraocular Movements: Extraocular movements intact.      Conjunctiva/sclera: Conjunctivae normal.   Neck:      Thyroid: No thyroid mass, thyromegaly or thyroid tenderness.      Trachea: Trachea normal.   Cardiovascular:      Rate and Rhythm: Normal rate and regular rhythm.      Pulses: Normal pulses.      Heart sounds: Normal heart sounds.   Pulmonary:      Effort: Pulmonary effort is normal.      Breath sounds: Normal breath sounds.   Chest:   Breasts:     Right: Normal. No mass, nipple discharge or tenderness.      Left: Normal. No mass, nipple discharge or tenderness.   Abdominal:      General: Bowel sounds are normal. There is no distension.      Palpations: Abdomen is soft.      Tenderness: There is no abdominal tenderness.   Genitourinary:     General: Normal vulva.      Vagina: No vaginal discharge (thin, white milky).   Musculoskeletal:      Right lower leg: No edema.      Left lower leg: No edema.   Lymphadenopathy:      Cervical: No cervical adenopathy.   Skin:     General: Skin is warm and dry.   Neurological:      Mental " Status: She is alert.   Psychiatric:         Behavior: Behavior is cooperative.           FAMILIA Ayala-S    Signed Electronically by: Haley Lancaster NP

## 2025-02-18 NOTE — PATIENT INSTRUCTIONS
"Patient Education   Consejos de atención preventiva  Se trata de consejos generales que solemos ofrecer para ayudar a las personas a mantenerse saludables. Salvador equipo de atención puede darle consejos específicos. Hable con ellos sobre yris propias necesidades de atención preventiva.  Estilo de esvin  Ejercítese, chloe mínimo, 150 minutos a la semana (30 minutos al día, 5 días a la semana).  Realice actividades de fortalecimiento muscular 2 días a la semana, ya que contribuyen al control del peso y a la prevención de enfermedades.  No fume.  Use protector solar para prevenir el cáncer de piel.  Cada 2 a 5 años, realice pruebas de detección de radón en salvador hogar. Se trata de un gas incoloro e inodoro que puede dañar los pulmones. Para obtener más información, visite [email or web address] y busque \"Radon in Homes\" (Radón en los hogares).  Mantenga las patsy descargadas y bajo llave en un lugar seguro, chloe isabel caja tiny o isabel cámara blindada, o use un candado para patsy y esconda las llaves. Guarde siempre las balas por separado. Para obtener más información, visite [place] y busque \"Safe Gun Storage\" (Almacenamiento seguro josh).  Alimentación  Consuma 5 o más porciones de frutas y verduras al día.  Pruebe el pan de dorcas, el arroz integral y la pasta integral (en lugar de pan vicente, arroz vicente y pasta).  Consuma suficiente calcio y vitamina D. Revise la etiqueta de los alimentos y procure alcanzar el 100 % de la ingesta diaria recomendada (IDR).  Exámenes periódicos  Hágase un examen dental y isabel limpieza cada 6 meses.  Visite a salvador equipo de atención médica todos los años para hablar sobre lo siguiente:  Todo cambio en salvador brock.  Todo medicamento que salvador equipo de atención le haya recetado.  Atención preventiva, planificación familiar y formas de prevenir enfermedades crónicas.  Inyecciones (vacunas)   Vacunas contra el virus del papiloma humano (VPH) (hasta los 26 años), si nunca se las stoll colocado.  Vacunas " contra la hepatitis B (hasta los 59 años), si nunca se las stoll colocado.  Vacuna contra la COVID-19: vacúnese cuando corresponda.  Vacuna contra la gripe: vacúnese contra la gripe todos los años.  Vacuna contra el tétanos: vacúnese contra el tétanos cada 10 años.  Vacunas contra el neumococo, la hepatitis A y el virus sincicial respiratorio (VSR): pregunte a salvador equipo de atención si las necesita en función de salvador riesgo.  Vacuna contra el herpes zóster (para personas de 50 años o más).  Pruebas médicas generales  Examen de detección de diabetes:  A partir de los 35 años, hágase exámenes de detección de diabetes, chloe mínimo, cada 3 años.  Si tiene menos de 35 años, pregunte a salvador equipo de atención si debe hacerlo.  Prueba de colesterol: a los 39 años, comience a hacerse isabel prueba de colesterol cada 5 años o con mayor frecuencia si se lo aconsejan.  Exploración de densidad ósea (DEXA): a los 50 años, pregunte a salvador equipo de atención si debe hacerse esta exploración para detectar osteoporosis (fragilidad en los huesos).  Hepatitis C: hágase la prueba, chloe mínimo, isabel vez en la esvin.  Examen de detección de aneurismas aórticos abdominales: hable con salvador médico sobre la posibilidad de hacerse linnea examen de detección si cumple alguna de las siguientes condiciones:  fumó alguna vez;  y  es hombre según salvador sexo biológico;  y  tiene entre 65 y 75 años.  Infecciones de transmisión sexual (ITS)  Antes de los 24 años, pregunte a salvador equipo de atención si debe hacerse exámenes de detección de ITS.  Después de los 24 años, hágase exámenes de detección de ITS si está en riesgo. Está en riesgo de contraer alguna ITS (incluido el virus de la inmunodeficiencia adquirida [VIH]) en los siguientes casos:  Tiene relaciones sexuales con más de isabel persona.  No usa preservativos siempre que tiene relaciones sexuales.  A usted o a salvador omar le diagnosticaron isabel infección de transmisión sexual.  Si está en riesgo de contraer el VIH,  consulte sobre los medicamentos de la profilaxis de preexposición (Pre-Exposure Prophylaxis, PrEP) para prevenirlo.  Hágase la prueba del VIH, chloe mínimo, isabel vez en la esvin, tanto si está en riesgo de contraer el virus chloe si no.  Pruebas de detección de cáncer  Examen de detección de cáncer de pacheco uterino: si tiene pacheco uterino, comience a hacerse pruebas periódicas de detección de cáncer de pacheco uterino a los 21 años. La mayoría de las personas que se hacen exámenes periódicos de detección y obtienen resultados normales pueden dejar de hacerlo a partir de los 65 años. Hable sobre esto con salvador proveedor.  Exploración de detección de cáncer de mama (mamografía): si alguna vez ha tenido mamas, comience a hacerse mamografías periódicas a partir de los 40 años. Se trata de isabel exploración para detectar el cáncer de mama.  Examen de detección de cáncer de colon: es importante comenzar a hacerse los exámenes de detección de cáncer de colon a los 45 años.  Hágase isabel colonoscopía cada 10 años (o con más frecuencia si está en riesgo) O pregunte a salvador proveedor sobre pruebas de heces, chloe la prueba inmunoquímica fecal (Fecal Immunochemical Test, FIT) cada año o la prueba Cologuard cada 3 años.  Para obtener más información sobre las opciones de las pruebas que tiene a disposición, visite: www.fvfiles.com/534687ms.  Si necesita ayuda para narciso isabel decisión, visite: [email or web address].  Prueba de detección de cáncer de próstata: si tiene próstata y está entre los 55 y 69 años, pregunte a salvador proveedor si le convendría hacerse isabel prueba anual de detección de cáncer de próstata.  Examen de detección de cáncer de pulmón: si fuma o fumó y tiene entre 50 y 80 años, pregunte a salvador equipo de atención si los exámenes continuos de detección de cáncer de pulmón son adecuados para usted.    Solo para uso con fines informativos. Haylee documento no pretende sustituir ninguna recomendación médica. Derechos de autor   2023 [facility].   Todos los derechos reservados. Revisión clínica a [facility] de [facility]. [employer] [id number] - REV 04/24.

## 2025-02-19 LAB
C TRACH DNA SPEC QL PROBE+SIG AMP: NEGATIVE
N GONORRHOEA DNA SPEC QL NAA+PROBE: NEGATIVE
SPECIMEN TYPE: NORMAL

## 2025-02-20 PROBLEM — E55.9 VITAMIN D DEFICIENCY: Status: ACTIVE | Noted: 2025-02-20

## 2025-03-11 ENCOUNTER — INFUSION THERAPY VISIT (OUTPATIENT)
Dept: INFUSION THERAPY | Facility: CLINIC | Age: 35
End: 2025-03-11
Attending: NURSE PRACTITIONER
Payer: COMMERCIAL

## 2025-03-11 VITALS
SYSTOLIC BLOOD PRESSURE: 113 MMHG | TEMPERATURE: 98.8 F | HEART RATE: 87 BPM | RESPIRATION RATE: 18 BRPM | WEIGHT: 158.7 LBS | BODY MASS INDEX: 29.26 KG/M2 | OXYGEN SATURATION: 98 % | DIASTOLIC BLOOD PRESSURE: 70 MMHG

## 2025-03-11 DIAGNOSIS — Z90.3 S/P GASTRIC SLEEVE PROCEDURE: Primary | ICD-10-CM

## 2025-03-11 DIAGNOSIS — D50.8 OTHER IRON DEFICIENCY ANEMIA: ICD-10-CM

## 2025-03-11 PROCEDURE — 258N000003 HC RX IP 258 OP 636: Performed by: NURSE PRACTITIONER

## 2025-03-11 PROCEDURE — 96365 THER/PROPH/DIAG IV INF INIT: CPT

## 2025-03-11 PROCEDURE — 250N000011 HC RX IP 250 OP 636: Mod: JZ | Performed by: NURSE PRACTITIONER

## 2025-03-11 PROCEDURE — 99207 PR NO CHARGE LOS: CPT

## 2025-03-11 RX ORDER — METHYLPREDNISOLONE SODIUM SUCCINATE 40 MG/ML
40 INJECTION INTRAMUSCULAR; INTRAVENOUS
Start: 2025-03-13

## 2025-03-11 RX ORDER — HEPARIN SODIUM,PORCINE 10 UNIT/ML
5-20 VIAL (ML) INTRAVENOUS DAILY PRN
OUTPATIENT
Start: 2025-03-13

## 2025-03-11 RX ORDER — FOLIC ACID 1 MG/1
1 TABLET ORAL DAILY
COMMUNITY

## 2025-03-11 RX ORDER — ALBUTEROL SULFATE 0.83 MG/ML
2.5 SOLUTION RESPIRATORY (INHALATION)
OUTPATIENT
Start: 2025-03-13

## 2025-03-11 RX ORDER — ALBUTEROL SULFATE 90 UG/1
1-2 INHALANT RESPIRATORY (INHALATION)
Start: 2025-03-13

## 2025-03-11 RX ORDER — EPINEPHRINE 1 MG/ML
0.3 INJECTION, SOLUTION INTRAMUSCULAR; SUBCUTANEOUS EVERY 5 MIN PRN
OUTPATIENT
Start: 2025-03-13

## 2025-03-11 RX ORDER — MEPERIDINE HYDROCHLORIDE 25 MG/ML
25 INJECTION INTRAMUSCULAR; INTRAVENOUS; SUBCUTANEOUS
OUTPATIENT
Start: 2025-03-13

## 2025-03-11 RX ORDER — DIPHENHYDRAMINE HYDROCHLORIDE 50 MG/ML
50 INJECTION, SOLUTION INTRAMUSCULAR; INTRAVENOUS
Start: 2025-03-13

## 2025-03-11 RX ORDER — HEPARIN SODIUM (PORCINE) LOCK FLUSH IV SOLN 100 UNIT/ML 100 UNIT/ML
5 SOLUTION INTRAVENOUS
OUTPATIENT
Start: 2025-03-13

## 2025-03-11 RX ORDER — DIPHENHYDRAMINE HYDROCHLORIDE 50 MG/ML
25 INJECTION, SOLUTION INTRAMUSCULAR; INTRAVENOUS
Start: 2025-03-13

## 2025-03-11 RX ADMIN — SODIUM CHLORIDE 250 ML: 0.9 INJECTION, SOLUTION INTRAVENOUS at 15:20

## 2025-03-11 RX ADMIN — FERUMOXYTOL 510 MG: 510 INJECTION INTRAVENOUS at 15:22

## 2025-03-11 NOTE — PROGRESS NOTES
Infusion Nursing Note:  Matilde Hylton Terrell presents today for first dose of Feraheme.    Patient seen by provider today: No   present during visit today: Yes, Language: Serbian.     Note:   Oriented patient to the infusion department, including how/when to use the call light.    States her energy has improved a little since taking vitamins.    Intravenous Access:  Peripheral IV placed.    Treatment Conditions:  Not Applicable.      Post Infusion Assessment:  Patient tolerated infusion without incident.  Patient observed for  30minutes post Feraheme per order.  Blood return noted pre and post infusion.  Site patent and intact, free from redness, edema or discomfort.  No evidence of extravasations.  Access discontinued per protocol.       Discharge Plan:   AVS to patient via MYCHART.  Patient will return 3/21/25 for next appointment.   Patient discharged in stable condition accompanied by: son.  Departure Mode: Ambulatory.      Mayela Sanford RN